# Patient Record
Sex: MALE | URBAN - METROPOLITAN AREA
[De-identification: names, ages, dates, MRNs, and addresses within clinical notes are randomized per-mention and may not be internally consistent; named-entity substitution may affect disease eponyms.]

---

## 2019-07-24 ENCOUNTER — NURSE TRIAGE (OUTPATIENT)
Dept: FAMILY MEDICINE | Facility: CLINIC | Age: 69
End: 2019-07-24

## 2019-07-24 NOTE — TELEPHONE ENCOUNTER
"Patient calling to seek advise on diarrhea and black stools. 5 days ago patient developed diarrhea with black stools. He started taking Pepto which did help the diarrhea some. He stopped the Pepto Sunday and continues to have diarrhea and black stools. RN advised ED visit. Patient said him and his wife are going to discuss their options and go from there. Did not say they were going to the ED. RN offered an appointment and they declined. Thanked RN and hung up.     OYLA AlexanderN, RN  [facility]    Reason for Disposition    Bloody, black, or tarry bowel movements    Additional Information    Negative: Sounds like a life-threatening emergency to the triager    Diarrhea is the main symptom    Negative: Shock suspected (e.g., cold/pale/clammy skin, too weak to stand, low BP, rapid pulse)    Negative: Difficult to awaken or acting confused (e.g., disoriented, slurred speech)    Negative: Sounds like a life-threatening emergency to the triager    Negative: Vomiting also present and worse than the diarrhea    Negative: Blood in stool and without diarrhea    Negative: SEVERE abdominal pain (e.g., excruciating) and present > 1 hour    Negative: SEVERE abdominal pain and age > 60    Answer Assessment - Initial Assessment Questions  1. SYMPTOM:  \"What's the main symptom you're concerned about?\" (e.g., pain, itching, swelling, rash)      5 days ago, noticed black stool and diarrhea. Started taking Pepto which did help the diarrhea some. Had black stools before starting Pepto  2. ONSET: \"When did the black stool  start?\"      5 days ago  3. RECTAL PAIN: \"Do you have any pain around your rectum?\" \"How bad is the pain?\"  (Scale 1-10; or mild, moderate, severe)   - MILD (1-3): doesn't interfere with normal activities    - MODERATE (4-7): interferes with normal activities or awakens from sleep, limping    - SEVERE (8-10): excruciating pain, unable to have a bowel movement       No pain  4. RECTAL ITCHING: \"Do you " "have any itching in this area?\" \"How bad is the itching?\"  (Scale 1-10; or mild, moderate, severe)   - MILD - doesn't interfere with normal activities    - MODERATE-SEVERE: interferes with normal activities or awakens from sleep      No itching  5. CONSTIPATION: \"Do you have constipation?\" If so, \"How bad is it?\"      No constipation  6. CAUSE: \"What do you think is causing the anus symptoms?\"      Unknown, did drink some water from a water bottle that was sitting in the hot sun all day  7. OTHER SYMPTOMS: \"Do you have any other symptoms?\"  (e.g., rectal bleeding, abdominal pain, vomiting, fever)      diarrhea  8. PREGNANCY: \"Is there any chance you are pregnant?\" \"When was your last menstrual period?\"      NO    Protocols used: DIARRHEA-A-OH, RECTAL SYMPTOMS-A-OH    "